# Patient Record
Sex: MALE | Race: WHITE | NOT HISPANIC OR LATINO | Employment: OTHER | URBAN - METROPOLITAN AREA
[De-identification: names, ages, dates, MRNs, and addresses within clinical notes are randomized per-mention and may not be internally consistent; named-entity substitution may affect disease eponyms.]

---

## 2021-03-03 DIAGNOSIS — Z23 NEED FOR VACCINATION: ICD-10-CM

## 2025-02-14 ENCOUNTER — APPOINTMENT (OUTPATIENT)
Dept: RADIOLOGY | Facility: MEDICAL CENTER | Age: 70
End: 2025-02-14
Attending: EMERGENCY MEDICINE
Payer: MEDICARE

## 2025-02-14 ENCOUNTER — HOSPITAL ENCOUNTER (EMERGENCY)
Facility: MEDICAL CENTER | Age: 70
End: 2025-02-14
Attending: EMERGENCY MEDICINE
Payer: MEDICARE

## 2025-02-14 ENCOUNTER — PHARMACY VISIT (OUTPATIENT)
Dept: PHARMACY | Facility: MEDICAL CENTER | Age: 70
End: 2025-02-14
Payer: MEDICARE

## 2025-02-14 VITALS
SYSTOLIC BLOOD PRESSURE: 162 MMHG | OXYGEN SATURATION: 95 % | HEART RATE: 75 BPM | RESPIRATION RATE: 15 BRPM | HEIGHT: 73 IN | DIASTOLIC BLOOD PRESSURE: 95 MMHG | WEIGHT: 222 LBS | TEMPERATURE: 97.4 F | BODY MASS INDEX: 29.42 KG/M2

## 2025-02-14 DIAGNOSIS — H49.01 RIGHT OCULOMOTOR NERVE PALSY: ICD-10-CM

## 2025-02-14 LAB
ANION GAP SERPL CALC-SCNC: 14 MMOL/L (ref 7–16)
APTT PPP: 26.6 SEC (ref 24.7–36)
BASOPHILS # BLD AUTO: 0.5 % (ref 0–1.8)
BASOPHILS # BLD: 0.05 K/UL (ref 0–0.12)
BUN SERPL-MCNC: 18 MG/DL (ref 8–22)
CALCIUM SERPL-MCNC: 9.6 MG/DL (ref 8.5–10.5)
CHLORIDE SERPL-SCNC: 103 MMOL/L (ref 96–112)
CO2 SERPL-SCNC: 21 MMOL/L (ref 20–33)
CREAT SERPL-MCNC: 1.33 MG/DL (ref 0.5–1.4)
CRP SERPL HS-MCNC: <0.3 MG/DL (ref 0–0.75)
EKG IMPRESSION: NORMAL
EOSINOPHIL # BLD AUTO: 0.01 K/UL (ref 0–0.51)
EOSINOPHIL NFR BLD: 0.1 % (ref 0–6.9)
ERYTHROCYTE [DISTWIDTH] IN BLOOD BY AUTOMATED COUNT: 42.1 FL (ref 35.9–50)
ERYTHROCYTE [SEDIMENTATION RATE] IN BLOOD BY WESTERGREN METHOD: 4 MM/HOUR (ref 0–20)
GFR SERPLBLD CREATININE-BSD FMLA CKD-EPI: 57 ML/MIN/1.73 M 2
GLUCOSE SERPL-MCNC: 112 MG/DL (ref 65–99)
HCT VFR BLD AUTO: 48.5 % (ref 42–52)
HGB BLD-MCNC: 16.4 G/DL (ref 14–18)
IMM GRANULOCYTES # BLD AUTO: 0.05 K/UL (ref 0–0.11)
IMM GRANULOCYTES NFR BLD AUTO: 0.5 % (ref 0–0.9)
INR PPP: 1.04 (ref 0.87–1.13)
LYMPHOCYTES # BLD AUTO: 2.41 K/UL (ref 1–4.8)
LYMPHOCYTES NFR BLD: 24.6 % (ref 22–41)
MCH RBC QN AUTO: 28.6 PG (ref 27–33)
MCHC RBC AUTO-ENTMCNC: 33.8 G/DL (ref 32.3–36.5)
MCV RBC AUTO: 84.6 FL (ref 81.4–97.8)
MONOCYTES # BLD AUTO: 0.57 K/UL (ref 0–0.85)
MONOCYTES NFR BLD AUTO: 5.8 % (ref 0–13.4)
NEUTROPHILS # BLD AUTO: 6.72 K/UL (ref 1.82–7.42)
NEUTROPHILS NFR BLD: 68.5 % (ref 44–72)
NRBC # BLD AUTO: 0 K/UL
NRBC BLD-RTO: 0 /100 WBC (ref 0–0.2)
PLATELET # BLD AUTO: 252 K/UL (ref 164–446)
PMV BLD AUTO: 9.7 FL (ref 9–12.9)
POTASSIUM SERPL-SCNC: 3.8 MMOL/L (ref 3.6–5.5)
PROTHROMBIN TIME: 13.6 SEC (ref 12–14.6)
RBC # BLD AUTO: 5.73 M/UL (ref 4.7–6.1)
SODIUM SERPL-SCNC: 138 MMOL/L (ref 135–145)
WBC # BLD AUTO: 9.8 K/UL (ref 4.8–10.8)

## 2025-02-14 PROCEDURE — 86140 C-REACTIVE PROTEIN: CPT

## 2025-02-14 PROCEDURE — RXMED WILLOW AMBULATORY MEDICATION CHARGE: Performed by: EMERGENCY MEDICINE

## 2025-02-14 PROCEDURE — 85730 THROMBOPLASTIN TIME PARTIAL: CPT

## 2025-02-14 PROCEDURE — 70496 CT ANGIOGRAPHY HEAD: CPT

## 2025-02-14 PROCEDURE — 99285 EMERGENCY DEPT VISIT HI MDM: CPT

## 2025-02-14 PROCEDURE — 70551 MRI BRAIN STEM W/O DYE: CPT

## 2025-02-14 PROCEDURE — 700117 HCHG RX CONTRAST REV CODE 255: Performed by: EMERGENCY MEDICINE

## 2025-02-14 PROCEDURE — 85025 COMPLETE CBC W/AUTO DIFF WBC: CPT

## 2025-02-14 PROCEDURE — 36415 COLL VENOUS BLD VENIPUNCTURE: CPT

## 2025-02-14 PROCEDURE — 96374 THER/PROPH/DIAG INJ IV PUSH: CPT

## 2025-02-14 PROCEDURE — 70544 MR ANGIOGRAPHY HEAD W/O DYE: CPT

## 2025-02-14 PROCEDURE — 700111 HCHG RX REV CODE 636 W/ 250 OVERRIDE (IP): Mod: JZ | Performed by: EMERGENCY MEDICINE

## 2025-02-14 PROCEDURE — 80048 BASIC METABOLIC PNL TOTAL CA: CPT

## 2025-02-14 PROCEDURE — 85652 RBC SED RATE AUTOMATED: CPT

## 2025-02-14 PROCEDURE — 85610 PROTHROMBIN TIME: CPT

## 2025-02-14 PROCEDURE — 93005 ELECTROCARDIOGRAM TRACING: CPT | Mod: TC | Performed by: EMERGENCY MEDICINE

## 2025-02-14 PROCEDURE — 70498 CT ANGIOGRAPHY NECK: CPT

## 2025-02-14 RX ORDER — MIDAZOLAM HYDROCHLORIDE 1 MG/ML
2 INJECTION INTRAMUSCULAR; INTRAVENOUS
Status: COMPLETED | OUTPATIENT
Start: 2025-02-14 | End: 2025-02-14

## 2025-02-14 RX ORDER — PREDNISONE 20 MG/1
60 TABLET ORAL ONCE
Status: COMPLETED | OUTPATIENT
Start: 2025-02-14 | End: 2025-02-14

## 2025-02-14 RX ORDER — PREDNISONE 20 MG/1
60 TABLET ORAL DAILY
Qty: 21 TABLET | Refills: 0 | Status: SHIPPED | OUTPATIENT
Start: 2025-02-14 | End: 2025-02-21

## 2025-02-14 RX ADMIN — MIDAZOLAM HYDROCHLORIDE 2 MG: 1 INJECTION, SOLUTION INTRAMUSCULAR; INTRAVENOUS at 18:16

## 2025-02-14 RX ADMIN — PREDNISONE 60 MG: 20 TABLET ORAL at 20:33

## 2025-02-14 RX ADMIN — IOHEXOL 80 ML: 350 INJECTION, SOLUTION INTRAVENOUS at 17:00

## 2025-02-14 NOTE — ED TRIAGE NOTES
Chief Complaint   Patient presents with    Sent by MD     Sent by MD for Isolated 3rd nerve palsy, Requesting MRI and MRA to Rule out aneurism pushing on the nerve      Blurred Vision     Blurred/double vision, starting Wednesday      Pt reports claustrophobia - will need to be medicated for imaging.     Pt ambulatory to triage for above complaint.      Pt is alert/oriented and follows commands. Pt speaking in full sentences and responds appropriately to questions. No acute distress noted in triage and respirations are even and unlabored.     Pt placed in lobby and educated on triage process. Pt encouraged to alert staff for any changes in condition.

## 2025-02-14 NOTE — ED PROVIDER NOTES
ED Provider Note    CHIEF COMPLAINT  Chief Complaint   Patient presents with    Sent by MD     Sent by MD for Isolated 3rd nerve palsy, Requesting MRI and MRA to Rule out aneurism pushing on the nerve      Blurred Vision     Blurred/double vision, starting Wednesday        EXTERNAL RECORDS REVIEWED  Outpatient Notes referred here for cranial nerve III palsy on the right    HPI/ROS  LIMITATION TO HISTORY   Select: : None  OUTSIDE HISTORIAN(S):  ofe Zazueta is a 69 y.o. male who presents to the emergency department chief complaint of double vision.  The patient states that everyone in their family was sick with strep earlier this week and he had been feeling sick and kind about a really bad headache for a few days and on Wednesday noticed some headache and some discount of eye pressure on the right side which could have progressed over the last 48 hours to the point that now he feels dizzy and he has double vision whenever he opens his eyes.  He denies any unilateral weakness numbness or tingling no facial droop no speech difficulty.  He followed up with his ophthalmologist today due to the double vision and there was sent here to be concern for a cranial nerve III palsy.    PAST MEDICAL HISTORY   has a past medical history of Numbness and tingling in right hand and Pain (11-07-14).    SURGICAL HISTORY   has a past surgical history that includes inj,epidural/lumb/sac single (6/6/2014); rotator cuff repair (1970); other (1980'S); cervical disk and fusion anterior (11/21/2014); and carpal tunnel release (11/21/2014).    FAMILY HISTORY  History reviewed. No pertinent family history.    SOCIAL HISTORY  Social History     Tobacco Use    Smoking status: Never    Smokeless tobacco: Never   Vaping Use    Vaping status: Never Used   Substance and Sexual Activity    Alcohol use: No    Drug use: No    Sexual activity: Not on file       CURRENT MEDICATIONS  Home Medications       Reviewed by Erendira Garcia  "GIANNA (Registered Nurse) on 02/14/25 at 1333  Med List Status: Partial     Medication Last Dose Status   hydrocodone-acetaminophen (NORCO) 5-325 MG TABS per tablet  Active   methocarbamol (ROBAXIN-750) 750 MG TABS  Active   senna-docusate (PERICOLACE OR SENOKOT S) 8.6-50 MG TABS  Active                    ALLERGIES  No Known Allergies    PHYSICAL EXAM  VITAL SIGNS: BP (!) 177/91   Pulse 73   Temp 36.2 °C (97.2 °F) (Temporal)   Resp 16   Ht 1.854 m (6' 1\")   Wt 101 kg (222 lb 0.1 oz)   SpO2 96%   BMI 29.29 kg/m²    Pulse OX: Pulse Oxygen level is within normal limits on room air  Constitutional: Alert in no apparent distress.  HENT: Normocephalic, Atraumatic, MMM  Eyes: Pupils equal round reactive to light bilaterally, extraocular muscles intact on the left, right cannot fully adduct little bit limited for looking up and down on medial.  Mild ptosis of the right eyelid conjunctiva normal, non-icteric.   Heart: Regular rate and rhythm, intact distal pulses   Lungs: Symmetrical movement, no resp distress   Abdomen: Non-tender, non-distended, normal bowel sounds  Skin: Warm, Dry, No erythema, No rash.   Neurologic: Alert and oriented, Symmetric smile, eyes shut tight bilaterally, forehead wrinkles bilaterally, sensation intact to light touch bilateral face, tongue midline, head turn and shoulder shrug with full strength. Hearing intact grossly bilaterally. 5/5 strength shoulder, elbow  b/l. 5/5 strength hip, knee, ankle b/l   SILT biceps, forearms, hands, SILT thighs, shins mid foot   NO pronator drift, negative romberg, no aphasia, no dysarthria  Both eyes open sees double        EKG/LABS  Labs Reviewed   BASIC METABOLIC PANEL - Abnormal; Notable for the following components:       Result Value    Glucose 112 (*)     All other components within normal limits   ESTIMATED GFR - Abnormal; Notable for the following components:    GFR (CKD-EPI) 57 (*)     All other components within normal limits   CBC WITH " DIFFERENTIAL   APTT   PROTHROMBIN TIME   CRP QUANTITIVE (NON-CARDIAC)   SED RATE       I have independently interpreted this EKG    RADIOLOGY/PROCEDURES   I have independently interpreted the diagnostic imaging associated with this visit and am waiting the final reading from the radiologist.   My preliminary interpretation is as follows:     CTA head -no evidence of bleed or large aneurysm  CTA neck no dissection  MRI brain without -no large stroke that I can appreciate    Radiologist interpretation:  MR-BRAIN-W/O   Final Result      1.  Partially empty sella.   2.  Otherwise, MRI of the brain without contrast within normal limits.   3.  Additional views of the orbits are unremarkable.      CT-CTA NECK WITH & W/O-POST PROCESSING   Final Result      No cervical arterial occlusion or high-grade focal stenosis.      CT-CTA HEAD WITH & W/O-POST PROCESS   Final Result      1.  No acute intracranial hemorrhage or CT evidence of territorial infarct.   2.  No evidence of intracranial aneurysm.      MR-MRA HEAD-W/O    (Results Pending)       COURSE & MEDICAL DECISION MAKING    ASSESSMENT, COURSE AND PLAN  Care Narrative:     Patient is a 69-year-old male who presents to the emergency department with binocular diplopia patient states that it has been progressing over the last 48 hours ophthalmology and they were concerned for cranial nerve injury.  If it is a cranial nerve III palsy has not complete because he does not have full ptosis and does not have pupil differentiation on that side.  However the rest of his neurologic exam is within normal limits however he cannot abduct and he is seeing double with both eyes open.  MRI of the brain has been ordered as well as a CTA of the head and neck as this is more sensitive specific for aneurysm.  Will evaluate for electrolyte abnormality such as hyperglycemia significant sodium complication as well as arrhythmia.    DISPOSITION AND DISCUSSIONS    4:51 PM  Discussed the patient  CTAs at this time.  He understands feels comfortable waiting for the MRI and is resting comfortably.  Laboratory analysis is unremarkable no significant electrolyte abnormality or hyperglycemia no evidence of elevated ESR CRP that would be concerning for temporal arteritis    8:21 PM  I reassessed patient at the bedside there is no evidence of an acute stroke there is no evidence of aneurysm he has a mildly empty sella on his MRI but otherwise nonspecific laboratory and Alysis is nonspecific his glucose is only 112 there is no other signs of inflammatory reactions.  He will be started on prednisone at home and given the first dose here in the emergency department as well as over the next week for possible peripheral neuropathy like component he will follow-up with ophthalmology as well as primary care provider and return to the emergency department for any new or worsening issues.      I have discussed management of the patient with the following physicians and AMISHA's:  none    Discussion of management with other QHP or appropriate source(s): None     Escalation of care considered, and ultimately not performed:acute inpatient care management, however at this time, the patient is most appropriate for outpatient management    Barriers to care at this time, including but not limited to:  na .     Decision tools and prescription drugs considered including, but not limited to: Medication modification prednisone .    The patient will return for new or worsening symptoms and is stable at the time of discharge.    The patient is referred to a primary physician for blood pressure management, diabetic screening, and for all other preventative health concerns.    DISPOSITION:  Patient will be discharged home in stable condition.    FOLLOW UP:  MIKI Flores  [id number]  [facility] [address]  [phone]    Schedule an appointment as soon as possible for a visit       Shawn Romeo M.D.  [address]  [id number]  [facility] [address]  [phone]    Go to       [facility], Emergency Dept  [address]  [place] [address] [phone]    If symptoms worsen      OUTPATIENT MEDICATIONS:  New Prescriptions    PREDNISONE (DELTASONE) 20 MG TAB    Take 3 Tablets by mouth every day for 7 days.         FINAL DIAGNOSIS  1. Right oculomotor nerve palsy         Electronically signed by: Candi Rose M.D., 2/14/2025 2:32 PM

## 2025-02-14 NOTE — ED NOTES
Pt ambulated to [facility] from [facility] with steady gait.  On monitor, call light in reach. Chart up for ERP.

## 2025-02-14 NOTE — ED NOTES
PIV established, labs drawn and sent  MRI screening form completed, meds ordered for claustrophobia.

## 2025-02-15 NOTE — ED NOTES
Pt discharged to [facility] with steady gait. GCS 15. IV discontinued and gauze placed, pt in possession of belongings. Pt provided discharge education and information pertaining to medications and follow up appointments. Pt received copy of discharge instructions and verbalized understanding.     Vitals:    02/14/25 1920   BP: (!) 162/95   Pulse: 75   Resp: 15   Temp: 36.3 °C (97.4 °F)   SpO2: 95%

## 2025-02-20 ENCOUNTER — OFFICE VISIT (OUTPATIENT)
Dept: OPHTHALMOLOGY | Facility: MEDICAL CENTER | Age: 70
End: 2025-02-20
Payer: MEDICARE

## 2025-02-20 DIAGNOSIS — E78.5 HYPERLIPIDEMIA, UNSPECIFIED HYPERLIPIDEMIA TYPE: ICD-10-CM

## 2025-02-20 DIAGNOSIS — I10 PRIMARY HYPERTENSION: ICD-10-CM

## 2025-02-20 DIAGNOSIS — E11.9 TYPE 2 DIABETES MELLITUS WITHOUT COMPLICATION, WITHOUT LONG-TERM CURRENT USE OF INSULIN (HCC): ICD-10-CM

## 2025-02-20 DIAGNOSIS — H49.01 3RD NERVE PALSY, PARTIAL, RIGHT: ICD-10-CM

## 2025-02-20 PROBLEM — E78.2 MIXED HYPERLIPIDEMIA: Status: ACTIVE | Noted: 2025-02-20

## 2025-02-20 PROCEDURE — 99205 OFFICE O/P NEW HI 60 MIN: CPT | Mod: 25 | Performed by: STUDENT IN AN ORGANIZED HEALTH CARE EDUCATION/TRAINING PROGRAM

## 2025-02-20 PROCEDURE — 92250 FUNDUS PHOTOGRAPHY W/I&R: CPT | Performed by: STUDENT IN AN ORGANIZED HEALTH CARE EDUCATION/TRAINING PROGRAM

## 2025-02-20 ASSESSMENT — CONF VISUAL FIELD
OD_INFERIOR_NASAL_RESTRICTION: 0
OD_SUPERIOR_NASAL_RESTRICTION: 0
OD_NORMAL: 1
OS_SUPERIOR_TEMPORAL_RESTRICTION: 0
OD_INFERIOR_TEMPORAL_RESTRICTION: 0
OS_SUPERIOR_NASAL_RESTRICTION: 0
OS_INFERIOR_TEMPORAL_RESTRICTION: 0
OD_SUPERIOR_TEMPORAL_RESTRICTION: 0
OS_NORMAL: 1
OS_INFERIOR_NASAL_RESTRICTION: 0

## 2025-02-20 ASSESSMENT — REFRACTION_WEARINGRX
OD_SPHERE: +1.25
OD_ADD: +2.00
OS_ADD: +2.00
OS_SPHERE: +1.25
OS_CYLINDER: +0.50
OS_AXIS: 171
SPECS_TYPE: TRIFOCAL
OD_AXIS: 175
OD_CYLINDER: +0.50

## 2025-02-20 ASSESSMENT — TONOMETRY
OD_IOP_MMHG: 14
OS_IOP_MMHG: 16

## 2025-02-20 ASSESSMENT — VISUAL ACUITY
CORRECTION_TYPE: GLASSES
OD_CC: J1+
METHOD: SNELLEN - LINEAR
OS_CC: 20/20
OD_CC: 20/20
OS_CC: J1+

## 2025-02-20 ASSESSMENT — REFRACTION_MANIFEST
OD_SPHERE: +1.50
OD_AXIS: 144
OS_CYLINDER: +0.50
OD_CYLINDER: +0.75
OS_AXIS: 174
OS_SPHERE: +1.50

## 2025-02-20 ASSESSMENT — ENCOUNTER SYMPTOMS: DOUBLE VISION: 1

## 2025-02-20 ASSESSMENT — EXTERNAL EXAM - RIGHT EYE: OD_EXAM: NORMAL

## 2025-02-20 ASSESSMENT — SLIT LAMP EXAM - LIDS
COMMENTS: NORMAL
COMMENTS: NORMAL

## 2025-02-20 ASSESSMENT — EXTERNAL EXAM - LEFT EYE: OS_EXAM: NORMAL

## 2025-02-20 NOTE — PROGRESS NOTES
Peds/Neuro Ophthalmology:   Nu Tidwell M.D.    Date & Time note created:    2/20/2025   4:16 PM     Referring MD / APRN:  MIKI Flores, No att. providers found    Patient ID:  Name:             Lee Zazueta   YOB: 1955  Age:                 69 y.o.  male   MRN:               [id number]    Chief Complaint/Reason for Visit:     Other (Double vision)      History of Present Illness:      Lee Zazueta is a 70 yo man presenting for evaluation of ptosis OD and double vision     He is referred by Dr Muñoz at [facility]. Exam 2/14/25 demonstrated VA 20/30 OD 20/25 OS, 1+ NS OU, normal optic nerves. Ptosis OD.     On 2/12 Lee developed R retro orbital throbbing pain. The next day he started to notice subtle changes in his vision which further developed into cody double vision and R ptosis within the next few days. He was able to see Dr Agudelo 2/14/25 who reecommended stat evaluation in the ER. Pt presented to the [facility] ER where work up included negative ESR and CRP. CTA head and neck were negative. MRI brain wo was negative for acute abnormality. He was discharged on prednisone 20mg daily    Lee continues to have double vision and ptosis OD. He describes the double vision as oblique, binocular. He denies jaw claudication, night sweats, scalp tenderness, unintentional weight loss.    Vascular risk factors: diabetic (A1c 6.5),  systolic in ED, no FREDO, no smoking history, elevated LDL         Review of Systems:  ROS    Past Medical History:   Past Medical History:   Diagnosis Date    Numbness and tingling in right hand     Pain 11-07-14    neck, right shoulder, 1/10       Past Surgical History:  Past Surgical History:   Procedure Laterality Date    CERVICAL DISK AND FUSION ANTERIOR  11/21/2014    Performed by Chad Mcguire M.D. at SURGERY [facility] ORS    CARPAL TUNNEL RELEASE  11/21/2014    Performed by Chad Mcguire M.D. at SURGERY [facility] ORS    INJ,EPIDURAL/LUMB/SAC  SINGLE  6/6/2014    Performed by Lukas Talavera M.D. at SURGERY [facility]    OTHER  1980'S    DEVIATED SEPTUM    ROTATOR CUFF REPAIR  1970    LEFT X 2, RIGHT X 1       Current Outpatient Medications:  Current Outpatient Medications   Medication Sig Dispense Refill    predniSONE (DELTASONE) 20 MG Tab Take 3 Tablets by mouth every day for 7 days. 21 Tablet 0    hydrocodone-acetaminophen (NORCO) 5-325 MG TABS per tablet Take 1-2 Tabs by mouth every four hours as needed ((Pain scale 1 - 3)). (Patient not taking: Reported on 2/20/2025) 60 Tab 0    senna-docusate (PERICOLACE OR SENOKOT S) 8.6-50 MG TABS Take 2 Tabs by mouth every day. (Patient not taking: Reported on 2/20/2025) 30 Tab 3    methocarbamol (ROBAXIN-750) 750 MG TABS Take 1 Tab by mouth 3 times a day. (Patient not taking: Reported on 2/20/2025) 40 Tab 3     No current facility-administered medications for this visit.       Allergies:  No Known Allergies    Family History:  Family History   Problem Relation Age of Onset    Glasses Mother     Glasses Father        Social History:  Social History     Socioeconomic History    Marital status:      Spouse name: Not on file    Number of children: Not on file    Years of education: Not on file    Highest education level: Not on file   Occupational History    Not on file   Tobacco Use    Smoking status: Never    Smokeless tobacco: Never   Vaping Use    Vaping status: Never Used   Substance and Sexual Activity    Alcohol use: No    Drug use: No    Sexual activity: Not on file   Other Topics Concern    Not on file   Social History Narrative    Retired     Social Drivers of Health     Financial Resource Strain: Not on file   Food Insecurity: Not on file   Transportation Needs: Not on file   Physical Activity: Not on file   Stress: Not on file   Social Connections: Not on file   Intimate Partner Violence: Not on file   Housing Stability: Not on file          Physical Exam:  Physical Exam    Oriented x  3  Weight/BMI: There is no height or weight on file to calculate BMI.  There were no vitals taken for this visit.    Base Eye Exam       Visual Acuity (Snellen - Linear)         Right Left    Dist cc 20/20 20/20    Near cc J1+ J1+      Correction: Glasses              Tonometry (i care, 1:26 PM)         Right Left    Pressure 14 16              Pupils         Pupils Dark Light Shape React APD    Right PERRL 5 4 Round Brisk None    Left PERRL 5 4 Round Brisk None              Visual Fields         Right Left     Full Full              Extraocular Movement         Right Left      Full     -- 70 --   100  70   -- 100 --    -- -- --   --  --   -- -- --                     Additional Tests       Color         Right Left    Ishihara 9/9 9/9              Stereo       Fly: -    Animals: 0/3    Circles: 2/9                  Strabismus Exam       Reading #1   (Edited by: Nu Tidwell M.D.)      Method: Alternate cover    Correction: cc      Distance Near Near +3DS N Bifocals                      - - 70 - -   - - - - - -                      XT 30 100  70  XT 35 - -  - -  XT > 50      flick RHT      flick RHT      flick RHT       - - 100 - -   - - - - - -                           Reading #2   (Edited by: Nu Tidwell M.D.)      Method: Duarte    Correction: cc      Distance Near Near +3DS N Bifocals                      - - - - - -   - - - - - -                      RHT 10 - -  - -  RHT 8 - -  - -  RHT 6     XT 14     XT 20     XT >40      - - - - - -   - - - - - -                   R Tilt L Tilt   LHT 4 RHT 18                              Comments      Subjectively prefers 30 base in and 5 RHT OD                 Slit Lamp and Fundus Exam       External Exam         Right Left    External Normal Normal              Slit Lamp Exam         Right Left    Lids/Lashes Normal Normal    Conjunctiva/Sclera White and quiet White and quiet    Cornea Clear Clear    Anterior Chamber Deep and quiet Deep and quiet    Iris Round and  reactive Round and reactive    Lens Clear Clear              Fundus Exam         Right Left    Disc Normal Normal    Macula Normal Normal                  Refraction       Wearing Rx         Sphere Cylinder Axis Add    Right +1.25 +0.50 175 +2.00    Left +1.25 +0.50 171 +2.00      Type: Trifocal              Manifest Refraction         Sphere Cylinder Axis    Right +1.50 +0.75 144    Left +1.50 +0.50 174                    Pertinent Lab/Test/Imaging Review:  MRA head wo 2/14/25:   My read: no Pcomm aneurysm, no flow limiting stenosis    MRI brain wo 2/14/25  1.  Partially empty sella.  2.  Otherwise, MRI of the brain without contrast within normal limits.  3.  Additional views of the orbits are unremarkable.  My read: no optic nerve abnormality. No acute midbrain stroke. Posterior fossa subarachnoid cyst    CTA head/neck 2/14/25  1.  No acute intracranial hemorrhage or CT evidence of territorial infarct.  2.  No evidence of intracranial aneurysm.    Labs 2/14/25  ESR 4  CRP <0.30  Plts 252    Assessment and Plan:     3rd nerve palsy, partial, right  70 yo man presenting with acute double vision and ptosis OD    Onset 2/12/25. Evaluated in ED 2/14 where vessel imaging and MRI brain wo negative. No GCA symptoms. ESR and CRP wnl    Exam 2/20/25: no APD, pupils equal and briskly reactive. VA 20/20 OD 20/20 OS, 9/9 color plates OU. Full CF. Mild supraduction and adduction deficit OD. Partial ptosis OD. R eye down and out. Steele demonstrates a large incomitant RHT and XT. Optic nerves pink with sharp disc margins. RNFL 87 OD 96 OS    Plan:   Exam consistent with a partial R 3rd nerve palsy. Work up in ER included negative vessel imaging, negative MRI brain and normal inflammatory markers. Pt additionally denies any GCA symptoms. 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 Vascular risk factors include DM2, HTN and hyperlipidemia (all untreated) as well as age. Pt to follow up with  PCP for vascular risk factor optimization. Will repeat testing in 2 months. If alignment fails to improve, will consider MRI IAC wwo contrast and MG panel    -RTC in 2 months  -Patching for symptomatic relief  -Follow up with PCP for vascular risk factor optimization     Primary hypertension  BP on ED presentation was 170s systolic  No antihypertensives    Plan:  Recommended home bp monitoring  FU with PCP     Type 2 diabetes mellitus without complication, without long-term current use of insulin (HCC)  Recent A1c 6.5  Recommended lifestyle modifications to reduce A1c. Pt to follow up with PCP for further management  Discussed risk factor for microvascular cranial neuropathies    Mixed hyperlipidemia  LDL elevated 2x above limit normal  Discussed dietary management and follow up with PCP         Nu Tidwell M.D.    72 total minutes were spent reviewing imaging, records, examining the patient and documenting.

## 2025-02-20 NOTE — PROGRESS NOTES
Peds/Neuro Ophthalmology:   Andrzej Rosenberg    Date & Time note created:    2/20/2025   1:29 PM     Referring MD / APRN:  MIKI Flores, No att. providers found    Patient ID:  Name:             Lee Zazueta   YOB: 1955  Age:                 69 y.o.  male   MRN:               [id number]    Chief Complaint/Reason for Visit:     Other (Double vision)      History of Present Illness:    Lee Zazueta is a 69 y.o. male   Other        Review of Systems:  Review of Systems   Eyes:  Positive for double vision.   All other systems reviewed and are negative.      Past Medical History:   Past Medical History:   Diagnosis Date    Numbness and tingling in right hand     Pain 11-07-14    neck, right shoulder, 1/10       Past Surgical History:  Past Surgical History:   Procedure Laterality Date    CERVICAL DISK AND FUSION ANTERIOR  11/21/2014    Performed by Chad Mcguire M.D. at SURGERY [facility] ORS    CARPAL TUNNEL RELEASE  11/21/2014    Performed by Chad Mcguire M.D. at SURGERY [facility] ORS    INJ,EPIDURAL/LUMB/SAC SINGLE  6/6/2014    Performed by Lukas Talavera M.D. at [facility]    OTHER  1980'S    DEVIATED SEPTUM    ROTATOR CUFF REPAIR  1970    LEFT X 2, RIGHT X 1       Current Outpatient Medications:  Current Outpatient Medications   Medication Sig Dispense Refill    predniSONE (DELTASONE) 20 MG Tab Take 3 Tablets by mouth every day for 7 days. 21 Tablet 0    hydrocodone-acetaminophen (NORCO) 5-325 MG TABS per tablet Take 1-2 Tabs by mouth every four hours as needed ((Pain scale 1 - 3)). (Patient not taking: Reported on 2/20/2025) 60 Tab 0    senna-docusate (PERICOLACE OR SENOKOT S) 8.6-50 MG TABS Take 2 Tabs by mouth every day. (Patient not taking: Reported on 2/20/2025) 30 Tab 3    methocarbamol (ROBAXIN-750) 750 MG TABS Take 1 Tab by mouth 3 times a day. (Patient not taking: Reported on 2/20/2025) 40 Tab 3     No current facility-administered medications  for this visit.       Allergies:  No Known Allergies    Family History:  Family History   Problem Relation Age of Onset    Glasses Mother     Glasses Father        Social History:  Social History     Socioeconomic History    Marital status:      Spouse name: Not on file    Number of children: Not on file    Years of education: Not on file    Highest education level: Not on file   Occupational History    Not on file   Tobacco Use    Smoking status: Never    Smokeless tobacco: Never   Vaping Use    Vaping status: Never Used   Substance and Sexual Activity    Alcohol use: No    Drug use: No    Sexual activity: Not on file   Other Topics Concern    Not on file   Social History Narrative    Retired     Social Drivers of Health     Financial Resource Strain: Not on file   Food Insecurity: Not on file   Transportation Needs: Not on file   Physical Activity: Not on file   Stress: Not on file   Social Connections: Not on file   Intimate Partner Violence: Not on file   Housing Stability: Not on file          Physical Exam:  Physical Exam    Oriented x 3  Weight/BMI: There is no height or weight on file to calculate BMI.  There were no vitals taken for this visit.    Base Eye Exam       Visual Acuity (Snellen - Linear)         Right Left    Dist cc 20/20 20/20    Near cc J1+ J1+      Correction: Glasses              Tonometry (i care, 1:26 PM)         Right Left    Pressure 14 16              Pupils         Pupils React    Right PERRL Slow    Left PERRL Slow                  Additional Tests       Color         Right Left    Ishihara 9/9 9/9              Stereo       Fly: -    Animals: 0/3    Circles: 2/9                  Refraction       Wearing Rx         Sphere Cylinder Axis Add    Right +1.25 +0.50 175 +2.00    Left +1.25 +0.50 171 +2.00      Type: Trifocal              Manifest Refraction         Sphere Cylinder Axis    Right +1.50 +0.75 144    Left +1.50 +0.50 174                    Pertinent Lab/Test/Imaging  Review:      Assessment and Plan:     No problem-specific Assessment & Plan notes found for this encounter.        Andrzej Rosenberg

## 2025-02-21 NOTE — ASSESSMENT & PLAN NOTE
BP on ED presentation was 170s systolic  No antihypertensives    Plan:  Recommended home bp monitoring  FU with PCP

## 2025-02-21 NOTE — ASSESSMENT & PLAN NOTE
Recent A1c 6.5  Recommended lifestyle modifications to reduce A1c. Pt to follow up with PCP for further management  Discussed risk factor for microvascular cranial neuropathies

## 2025-02-21 NOTE — ASSESSMENT & PLAN NOTE
70 yo man presenting with acute double vision and ptosis OD    Onset 2/12/25. Evaluated in ED 2/14 where vessel imaging and MRI brain wo negative. No GCA symptoms. ESR and CRP wnl    Exam 2/20/25: no APD, pupils equal and briskly reactive. VA 20/20 OD 20/20 OS, 9/9 color plates OU. Full CF. Mild supraduction and adduction deficit OD. Partial ptosis OD. R eye down and out. Steele demonstrates a large incomitant RHT and XT. Optic nerves pink with sharp disc margins. RNFL 87 OD 96 OS    Plan:   Exam consistent with a partial R 3rd nerve palsy. Work up in ER included negative vessel imaging, negative MRI brain and normal inflammatory markers. Pt additionally denies any GCA symptoms. 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 Vascular risk factors include DM2, HTN and hyperlipidemia (all untreated) as well as age. Pt to follow up with PCP for vascular risk factor optimization. Will repeat testing in 2 months. If alignment fails to improve, will consider MRI IAC wwo contrast and MG panel    -RTC in 2 months  -Patching for symptomatic relief  -Follow up with PCP for vascular risk factor optimization

## 2025-04-14 NOTE — PROGRESS NOTES
Peds/Neuro Ophthalmology:   Nu Tidwell M.D.    Date & Time note created:    4/15/2025   8:53 AM     Referring MD / APRN:  MIKI Flores, No att. providers found    Patient ID:  Name:             Lee Zazueta   YOB: 1955  Age:                 69 y.o.  male   MRN:               [id number]    Chief Complaint/Reason for Visit:     Other (2 month f.v. for 3rd never palsy, partial right )      History of Present Illness:      Lee Zazueta is a 68 yo man presenting for follow up evaluation of a R 3rd nerve palsy. He was last seen 2/20/25    Gene noticed the double vision completely resolved 2 weeks ago. He will occasionally notice that his eyes take a moment to adjust when going from near to distance, but denies any diplopia on central gaze. He denies any headaches, scalp tenderness, jaw claudication. He is continuing to work on healthy lifestyle choices, and reports home Bps are normal. He did recently hit his R eye on a hard surface when helping his son move.     As a recap,  He is referred by Dr Muñoz at [facility]. Exam 2/14/25 demonstrated VA 20/30 OD 20/25 OS, 1+ NS OU, normal optic nerves. Ptosis OD.     On 2/12 Lee developed R retro orbital throbbing pain. The next day he started to notice subtle changes in his vision which further developed into cody double vision and R ptosis within the next few days. He was able to see Dr Agudelo 2/14/25 who reecommended stat evaluation in the ER. Pt presented to the [facility] ER where work up included negative ESR and CRP. CTA head and neck were negative. MRI brain wo was negative for acute abnormality. He was discharged on prednisone 20mg daily, He denied jaw claudication, night sweats, scalp tenderness, unintentional weight loss.    Initial exam demonstrated a partial R 3rd nerve palsy. Work up in ER included negative vessel imaging, negative MRI brain and normal inflammatory markers. Pt additionally denies any GCA symptoms. 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 Vascular risk factors include DM2, HTN and hyperlipidemia (all untreated) as well as age. Pt to follow up with PCP for vascular risk factor optimization.  If alignment fails to improve, will consider MRI IAC wwo contrast and MG panel    Vascular risk factors: diabetic (A1c 6.5),  systolic in ED, no FREDO, no smoking history, elevated LDL         Review of Systems:  ROS    Past Medical History:   Past Medical History:   Diagnosis Date    Numbness and tingling in right hand     Pain 11-07-14    neck, right shoulder, 1/10       Past Surgical History:  Past Surgical History:   Procedure Laterality Date    CERVICAL DISK AND FUSION ANTERIOR  11/21/2014    Performed by Chad Mcguire M.D. at SUR[facility]ERY [facility]    CARPAL TUNNEL RELEASE  11/21/2014    Performed by Chad Mcguire M.D. at SUR[facility]ERY [facility]    INJ,EPIDURAL/LUMB/SAC SINGLE  6/6/2014    Performed by Lukas Talavera M.D. at [facility]    OTHER  1980'S    DEVIATED SEPTUM    ROTATOR CUFF REPAIR  1970    LEFT X 2, RIGHT X 1       Current Outpatient Medications:  Current Outpatient Medications   Medication Sig Dispense Refill    hydrocodone-acetaminophen (NORCO) 5-325 MG TABS per tablet Take 1-2 Tabs by mouth every four hours as needed ((Pain scale 1 - 3)). (Patient not taking: Reported on 2/20/2025) 60 Tab 0    senna-docusate (PERICOLACE OR SENOKOT S) 8.6-50 MG TABS Take 2 Tabs by mouth every day. (Patient not taking: Reported on 2/20/2025) 30 Tab 3    methocarbamol (ROBAXIN-750) 750 MG TABS Take 1 Tab by mouth 3 times a day. (Patient not taking: Reported on 2/20/2025) 40 Tab 3     No current facility-administered medications for this visit.       Allergies:  No Known Allergies    Family History:  Family History   Problem Relation Age of Onset    Glasses Mother     Glasses Father        Social History:  Social History     Socioeconomic History    Marital status:       Spouse name: Not on file    Number of children: Not on file    Years of education: Not on file    Highest education level: Not on file   Occupational History    Not on file   Tobacco Use    Smoking status: Never    Smokeless tobacco: Never   Vaping Use    Vaping status: Never Used   Substance and Sexual Activity    Alcohol use: No    Drug use: No    Sexual activity: Not on file   Other Topics Concern    Not on file   Social History Narrative    Retired     Social Drivers of Health     Financial Resource Strain: Not on file   Food Insecurity: Not on file   Transportation Needs: Not on file   Physical Activity: Not on file   Stress: Not on file   Social Connections: Not on file   Intimate Partner Violence: Not on file   Housing Stability: Not on file          Physical Exam:  Physical Exam    Oriented x 3  Weight/BMI: There is no height or weight on file to calculate BMI.  There were no vitals taken for this visit.    Base Eye Exam       Visual Acuity (Snellen - Linear)         Right Left    Dist cc 20/20 -1 20/25    Dist ph cc  20/20 -2      Correction: Glasses              Tonometry (i-care, 7:47 AM)         Right Left    Pressure 16 17              Pupils         Dark Light Shape React APD    Right 5 4 Round Brisk None    Left 5 4 Round Brisk None              Visual Fields         Right Left     Full Full              Extraocular Movement         Right Left     Full Full              Neuro/Psych       Oriented x3: Yes    Mood/Affect: Normal                  Additional Tests       Color         Right Left    Ishihara 8/9 9/9              Stereo       Fly: +    Animals: 3/3    Circles: 3/9                  Strabismus Exam       Method: Alternate cover    Correction: cc      Distance Near Near +3.00DS Near Bifocals     X' 10                 - - - - - -  X 4 - - - - - -                      X 2 - -  - -  X 4 - -  - -  X 2                     - - - - - -  X 2 - - - - - -                       Slit Lamp and  Fundus Exam       External Exam         Right Left    External small ecchymosis in inferior orbit Normal              Slit Lamp Exam         Right Left    Lids/Lashes Normal Normal    Conjunctiva/Sclera White and quiet White and quiet    Cornea Clear Clear    Anterior Chamber Deep and quiet Deep and quiet    Iris Round and reactive Round and reactive    Lens Nuclear sclerosis Nuclear sclerosis              Fundus Exam         Right Left    Disc pink, sharp disc margins, flat pink, sharp disc margins, flat    C/D Ratio 0.5 0.5    Macula Normal Normal                  Refraction       Wearing Rx         Sphere Cylinder Axis Add    Right +1.25 +0.50 009 +2.25    Left +1.25 +0.50 169 +2.25      Age: 2yrs    Type: Bifocal              Manifest Refraction (Auto)         Sphere Cylinder Axis    Right +1.50 +1.25 171    Left +1.75 +0.50 177                    Pertinent Lab/Test/Imaging Review:  MRA head wo 2/14/25:   My read: no Pcomm aneurysm, no flow limiting stenosis    MRI brain wo 2/14/25  1.  Partially empty sella.  2.  Otherwise, MRI of the brain without contrast within normal limits.  3.  Additional views of the orbits are unremarkable.  My read: no optic nerve abnormality. No acute midbrain stroke. Posterior fossa subarachnoid cyst    CTA head/neck 2/14/25  1.  No acute intracranial hemorrhage or CT evidence of territorial infarct.  2.  No evidence of intracranial aneurysm.    Labs 2/14/25  ESR 4  CRP <0.30  Plts 252    Assessment and Plan:     3rd nerve palsy, partial, right  70 yo man presenting with acute double vision and ptosis OD    Onset 2/12/25. Evaluated in ED 2/14 where vessel imaging and MRI brain wo negative. No GCA symptoms. ESR and CRP wnl    Exam 2/20/25: no APD, pupils equal and briskly reactive. VA 20/20 OD 20/20 OS, 9/9 color plates OU. Full CF. Mild supraduction and adduction deficit OD. Partial ptosis OD. R eye down and out. Steele demonstrates a large incomitant RHT and XT. Optic nerves pink  with sharp disc margins. RNFL 87 OD 96 OS    Exam 4/15/25: normal afferent visual exam. Small comitant exo on alt cover. Resolved ptosis. Optic nerves pink with sharp disc margins. RNFL 95 OD 96 OS    Plan:   Exam demonstrates complete resolution of prior partial R 3rd nerve palsy. Work up in ER included negative vessel imaging, negative MRI brain and normal inflammatory markers. Pt continues to deny any GCA symptoms. 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 Vascular risk factors include DM2, HTN and hyperlipidemia (all untreated) as well as age. Pt to follow up with PCP for vascular risk factor optimization.    -RTC prn  -Follow up with PCP for vascular risk factor optimization     Type 2 diabetes mellitus without complication, without long-term current use of insulin (HCC)  Recent A1c 6.5  Recommended lifestyle modifications to reduce A1c. Pt to follow up with PCP for further management  Discussed risk factor for microvascular cranial neuropathies    Primary hypertension  BP on ED presentation was 170s systolic  No antihypertensives  Reports home BP's well controlled    Plan:  FU with PCP     Mixed hyperlipidemia  LDL elevated 141  Goal LDL <70  Discussed dietary management and follow up with PCP , may need initiation of statin          Nu Tidwell M.D.  26 total minutes were spent reviewing imaging, records, examining the patient and documenting.

## 2025-04-15 ENCOUNTER — OFFICE VISIT (OUTPATIENT)
Dept: OPHTHALMOLOGY | Facility: MEDICAL CENTER | Age: 70
End: 2025-04-15
Payer: MEDICARE

## 2025-04-15 DIAGNOSIS — E78.2 MIXED HYPERLIPIDEMIA: ICD-10-CM

## 2025-04-15 DIAGNOSIS — H49.01 3RD NERVE PALSY, PARTIAL, RIGHT: ICD-10-CM

## 2025-04-15 DIAGNOSIS — E11.9 TYPE 2 DIABETES MELLITUS WITHOUT COMPLICATION, WITHOUT LONG-TERM CURRENT USE OF INSULIN (HCC): ICD-10-CM

## 2025-04-15 DIAGNOSIS — I10 PRIMARY HYPERTENSION: ICD-10-CM

## 2025-04-15 PROCEDURE — 99213 OFFICE O/P EST LOW 20 MIN: CPT | Mod: 25 | Performed by: STUDENT IN AN ORGANIZED HEALTH CARE EDUCATION/TRAINING PROGRAM

## 2025-04-15 PROCEDURE — 92133 CPTRZD OPH DX IMG PST SGM ON: CPT | Performed by: STUDENT IN AN ORGANIZED HEALTH CARE EDUCATION/TRAINING PROGRAM

## 2025-04-15 PROCEDURE — 92060 SENSORIMOTOR EXAMINATION: CPT | Performed by: STUDENT IN AN ORGANIZED HEALTH CARE EDUCATION/TRAINING PROGRAM

## 2025-04-15 ASSESSMENT — VISUAL ACUITY
OS_PH_CC+: -2
CORRECTION_TYPE: GLASSES
OD_CC: 20/20
METHOD: SNELLEN - LINEAR
OD_CC+: -1
OS_PH_CC: 20/20
OS_CC: 20/25

## 2025-04-15 ASSESSMENT — SLIT LAMP EXAM - LIDS
COMMENTS: NORMAL
COMMENTS: NORMAL

## 2025-04-15 ASSESSMENT — REFRACTION_MANIFEST
METHOD_AUTOREFRACTION: 1
OS_AXIS: 177
OD_CYLINDER: +1.25
OS_SPHERE: +1.75
OS_CYLINDER: +0.50
OD_AXIS: 171
OD_SPHERE: +1.50

## 2025-04-15 ASSESSMENT — CUP TO DISC RATIO
OD_RATIO: 0.5
OS_RATIO: 0.5

## 2025-04-15 ASSESSMENT — CONF VISUAL FIELD
OD_NORMAL: 1
OD_INFERIOR_TEMPORAL_RESTRICTION: 0
OS_SUPERIOR_NASAL_RESTRICTION: 0
OS_SUPERIOR_TEMPORAL_RESTRICTION: 0
OS_NORMAL: 1
OD_INFERIOR_NASAL_RESTRICTION: 0
OD_SUPERIOR_TEMPORAL_RESTRICTION: 0
OS_INFERIOR_TEMPORAL_RESTRICTION: 0
OD_SUPERIOR_NASAL_RESTRICTION: 0
OS_INFERIOR_NASAL_RESTRICTION: 0

## 2025-04-15 ASSESSMENT — REFRACTION_WEARINGRX
OD_ADD: +2.25
OS_AXIS: 169
OS_ADD: +2.25
OD_CYLINDER: +0.50
OD_AXIS: 009
OS_SPHERE: +1.25
OS_CYLINDER: +0.50
OD_SPHERE: +1.25
SPECS_TYPE: BIFOCAL

## 2025-04-15 ASSESSMENT — EXTERNAL EXAM - LEFT EYE: OS_EXAM: NORMAL

## 2025-04-15 ASSESSMENT — TONOMETRY
OD_IOP_MMHG: 16
OS_IOP_MMHG: 17
IOP_METHOD: I-CARE

## 2025-04-15 NOTE — ASSESSMENT & PLAN NOTE
LDL elevated 141  Goal LDL <70  Discussed dietary management and follow up with PCP , may need initiation of statin

## 2025-04-15 NOTE — ASSESSMENT & PLAN NOTE
BP on ED presentation was 170s systolic  No antihypertensives  Reports home BP's well controlled    Plan:  FU with PCP

## 2025-04-15 NOTE — ASSESSMENT & PLAN NOTE
68 yo man presenting with acute double vision and ptosis OD    Onset 2/12/25. Evaluated in ED 2/14 where vessel imaging and MRI brain wo negative. No GCA symptoms. ESR and CRP wnl    Exam 2/20/25: no APD, pupils equal and briskly reactive. VA 20/20 OD 20/20 OS, 9/9 color plates OU. Full CF. Mild supraduction and adduction deficit OD. Partial ptosis OD. R eye down and out. Steele demonstrates a large incomitant RHT and XT. Optic nerves pink with sharp disc margins. RNFL 87 OD 96 OS    Exam 4/15/25: normal afferent visual exam. Small comitant exo on alt cover. Resolved ptosis. Optic nerves pink with sharp disc margins. RNFL 95 OD 96 OS    Plan:   Exam demonstrates complete resolution of prior partial R 3rd nerve palsy. Work up in ER included negative vessel imaging, negative MRI brain and normal inflammatory markers. Pt continues to deny any GCA symptoms. 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 Vascular risk factors include DM2, HTN and hyperlipidemia (all untreated) as well as age. Pt to follow up with PCP for vascular risk factor optimization.    -RTC prn  -Follow up with PCP for vascular risk factor optimization

## 2025-05-29 ENCOUNTER — RESEARCH ENCOUNTER (OUTPATIENT)
Dept: RESEARCH | Facility: MEDICAL CENTER | Age: 70
End: 2025-05-29

## 2025-05-29 ENCOUNTER — HOSPITAL ENCOUNTER (OUTPATIENT)
Facility: MEDICAL CENTER | Age: 70
End: 2025-05-29
Attending: INTERNAL MEDICINE
Payer: MEDICARE

## 2025-05-29 VITALS
SYSTOLIC BLOOD PRESSURE: 124 MMHG | RESPIRATION RATE: 16 BRPM | HEART RATE: 60 BPM | HEIGHT: 73 IN | BODY MASS INDEX: 18.69 KG/M2 | WEIGHT: 141 LBS | OXYGEN SATURATION: 95 % | DIASTOLIC BLOOD PRESSURE: 64 MMHG

## 2025-05-29 DIAGNOSIS — R00.2 PALPITATIONS: ICD-10-CM

## 2025-05-29 DIAGNOSIS — E78.2 MIXED HYPERLIPIDEMIA: Primary | ICD-10-CM

## 2025-05-29 PROCEDURE — 99215 OFFICE O/P EST HI 40 MIN: CPT | Performed by: INTERNAL MEDICINE

## 2025-05-29 NOTE — PATIENT INSTRUCTIONS
A - Antiplatelet - Clopidogrel (PLAVIX) reduces your risk of cardiac event by 27% compared to Aspirin 81 mg daily (HOST EXAM study 2021), prasrugrel (EFFIENT), ticagrelor (BRILINTA)) may be used for the first year.  Aspirin 81 mg daily is associated with a 20% less use of heart event and is used the first year after a cardiac event, stent or CABG.  B - Blood Pressure Control - reduces your risk or heart attack and stroke, the goal is <130/80.  C - Cholesterol Management - statins dramatically reduce your risk; for those that are intolerant to statins, there are alternatives.  D - Diet - MEDITERRANEAN DIET or Cardiac rehab diets, Cardiosmart.org.  E - Exercise - at least 2.5 hours of moderate exercise weekly  (typical brisk walking or similar activity).  F - Fats - VASCEPA, or EPA Fish oil (if Vascepa too expensive) for elevated triglycerides (REDUCE IT trial showed reduction from 22% 5 year MACE to 17%).  G - Good Vibes - meditation, exercise, yoga, Pilates, mindfulness, Lai-Chi, stress reduction.  H - Heart Failure - betablockers, sucubatril (ENTRESTO) 16% less risk of dying over 3 years, spironolactone, empagliflozin (JARDIANCE) 17% less risk of dying over 2 years, CRT +/- ICD.  I - Inflammation - Colchicine in the LoDoCo2 study in 2020 reduced the risk of heart attack by 30% in 2.5 year follow up.  R - Rehab - Cardiac Rehab reduces risk of dying by 13-24% and need to go to the hospital by 30% within the first year. Compared to regular Cardiac Rehab, Intensive Cardiac Rehab ([place] [facility]) was shown to reduce the risk of major events 17% to 11% and hospitalization for CHF from 8% to 2%. (Nutrients 5053Bur09(11:8645)  S - Smoking - never smoke, if you do smoke ask for help to quit for good. Patients who quit smoking after heart attack have 36% less likely risk of dying.  Resources are 1-800-QUIT-NOW 3sun in addition to Chantix, bupropion (Zyban) or nicotine replacement  T - Type II Diabetes  - pills empagliflozin (JARDIANCE) 38% less risk of dying over 4 years, and/or weekly injections: tirzepatide (Mounjaro), semaglutide (Ozempic), liraglutide (Victoza), dulaglutide (Trulicity) ~26% less risk of MACE in 2 years.  V - Vaccines - Annual flu shot and COVID vaccine reduces the risk of serious cardiovascular complications from these deadly infections.  W - Weight - maintain a healthy weight. Semaglutide (WEGOVY) weekly injection was shown to reduce weight by 10% and heart events by 20% for patients with CAD and BMI > 27 in the SELECT trial (6.5% vs 8% in 48 month follow up [facility] 12/2023).      Work on at least 2.5 - 5 hours a week of moderate exercise    Please look into the following diets and incorporate them into your diet  LOW SALT DIET   KEEP YOUR SODIUM EQUAL TO CALORIES AND NO MORE THAN DOUBLE THE CALORIES FOR A LOW SALT DIET    Cardiosmart.org - great resource for American College of Cardiology on heart disease prevention and treatment    FOR TREATMENT OR PREVENTION OF CORONARY ARTERY DISEASE  These three programs are approved by Medicare/Insurers for those with heart disease  Pratik - [facility]  Dr. Romero's Program for Reversing Heart Disease - Lloyd Maldonado's Cardiologist vegetarian-based  [facility] - [place]-based mind-body Program    Mediterranean Diet has been shown to be a hearty healthy diet.    This is a commonly referenced Program  Dr Tijerina - [place] over [place] (book and documentary) - vegetarian-based    FOR TREATMENT OF BLOOD PRESSURE  DASH DIET - American Heart Association for treatment of HYPERTENSION    FOR TREATMENT OF BAD CHOLESTEROL/FATS  REDUCE PROCESSED SUGAR AS MUCH AS POSSIBLE  INCREASE WHOLE GRAINS/VEGETABLES  INCREASE FIBER    Lowering total cholesterol and LDL (bad) cholesterol:  - Eat leaner cuts of meat, or eliminate altogether if possible red meat, and frequently substitute fish or chicken.  - Limit saturated  fat to no more than 7-10% of total calories no more than 10 g per day is recommended. Some sources of saturated fat include butter, animal fats, hydrogenated vegetable fats and oils, many desserts, whole milk dairy products.  - Replaced saturated fats with polyunsaturated fats and monounsaturated fats. Foods high in monounsaturated fat include nuts, canola oil, avocados, and olives.  - Limit trans fat (processed foods) and replaced with fresh fruits and vegetables  - Recommend nonfat dairy products  - Increase substantially the amount of soluble fiber intake (legumes such as beans, fruit, whole grains).  - Consider nutritional supplements: plant sterile spreads such as Benecol, fish oil,  flaxseed oil, omega-3 acids EPA capsules 2000 mg twice a day, or viscous fiber such as Metamucil  - Attain ideal weight and regular exercise (at least 30 minutes per day of moderate exercise)  ASK ABOUT STATIN OR NON STATIN MEDICATION TO REDUCE YOUR LDL AND HEART RISK    Lowering triglycerides:  - Reduce intake of simple sugar: Desserts, candy, pastries, honey, sodas, sugared cereals, yogurt, Gatorade, sports bars, canned fruit, smoothies, fruit juice, coffee drinks  - Reduced intake of refined starches: Refined Pasta, most bread  - Reduce or abstain from alcohol  - Increase omega-3 fatty acids: [place], Trout, Mackerel, [place]ring, [place] tuna and supplements  - Attain ideal weight and regular exercise (at least 30 minutes per day of moderate exercise)  ASK ABOUT PURIFIED OMEGA 3 EPA or FISH OIL TO REDUCE YOUR TG AND HEART RISK    Elevating HDL (good) cholesterol:  - Increase physical activity  - Increase omega-3 fatty acids and supplements as listed above  - Incorporating appropriate amounts of monounsaturated fats such as nuts, olive oil, canola oil, avocados, olives  - Stop smoking  - Attain ideal weight and regular exercise (at least 30 minutes per day of moderate exercise)

## 2025-05-29 NOTE — LETTER
[facility]   [address],   [address]  [place] [address]  Phone: [phone]  Fax: [phone]              Lee Hiltonagus  1955    Encounter Date: 5/29/2025    Aman Koehler M.D.          PROGRESS NOTE:  No notes on file      EZIO FloresR.N.  [address] [address]  Via Fax: [phone]

## 2025-05-29 NOTE — PROGRESS NOTES
"[facility] Cardiology Note      Patient's PCP: IMKI Flores    CC:   Chief Complaint   Patient presents with    New Patient    Hyperlipidemia    Hypertension       HPI:  69 yo here for cardiovascular risk assement      Reited dentist    His daughter is a RN worked at [facility]   Current list of administered Medications:  Current Medications[1]    Past Medical History[2]    Past Surgical History[3]    Family History   Problem Relation Age of Onset    Glasses Mother     Glasses Father      Patient family history was personally reviewed, no pertinent family history to current presentation    Social History[4]    ALLERGIES:  Allergies[5]    Review of systems:  ROS      Physical exam:  /64 (BP Location: Left arm, Patient Position: Sitting, BP Cuff Size: Adult)   Pulse 60   Resp 16   Ht 1.854 m (6' 1\")   Wt 64 kg (141 lb)   SpO2 95%   BMI 18.60 kg/m²     Physical Exam    General: No acute distress.   EYES: no jaundice  HEENT: normal oral mucosa  Neck:  No JVD.   CVS:  [RRR. S1 + S2. No M/R/G  Resp: Normal respiratory effort, CTAB. No wheezing or crackles/rhonchi.  Abdomen: Soft, ND,  Skin: Grossly nothing acute no obvious rashes  Neurological: Alert, Moves all extremities  Extremities:   [  no edema. No cyanosis.       Data:  Laboratory studies personally reviewed by me:  Recent Results (from the past 24 weeks)   CBC WITH DIFFERENTIAL    Collection Time: 02/14/25  3:00 PM   Result Value Ref Range    WBC 9.8 4.8 - 10.8 K/uL    RBC 5.73 4.70 - 6.10 M/uL    Hemoglobin 16.4 14.0 - 18.0 g/dL    Hematocrit 48.5 42.0 - 52.0 %    MCV 84.6 81.4 - 97.8 fL    MCH 28.6 27.0 - 33.0 pg    MCHC 33.8 32.3 - 36.5 g/dL    RDW 42.1 35.9 - 50.0 fL    Platelet Count 252 164 - 446 K/uL    MPV 9.7 9.0 - 12.9 fL    Neutrophils-Polys 68.50 44.00 - 72.00 %    Lymphocytes 24.60 22.00 - 41.00 %    Monocytes 5.80 0.00 - 13.40 %    Eosinophils 0.10 0.00 - 6.90 %    Basophils 0.50 0.00 - 1.80 %    Immature Granulocytes 0.50 0.00 - " 0.90 %    Nucleated RBC 0.00 0.00 - 0.20 /100 WBC    Neutrophils (Absolute) 6.72 1.82 - 7.42 K/uL    Lymphs (Absolute) 2.41 1.00 - 4.80 K/uL    Monos (Absolute) 0.57 0.00 - 0.85 K/uL    Eos (Absolute) 0.01 0.00 - 0.51 K/uL    Baso (Absolute) 0.05 0.00 - 0.12 K/uL    Immature Granulocytes (abs) 0.05 0.00 - 0.11 K/uL    NRBC (Absolute) 0.00 K/uL   BASIC METABOLIC PANEL    Collection Time: 25  3:00 PM   Result Value Ref Range    Sodium 138 135 - 145 mmol/L    Potassium 3.8 3.6 - 5.5 mmol/L    Chloride 103 96 - 112 mmol/L    Co2 21 20 - 33 mmol/L    Glucose 112 (H) 65 - 99 mg/dL    Bun 18 8 - 22 mg/dL    Creatinine 1.33 0.50 - 1.40 mg/dL    Calcium 9.6 8.5 - 10.5 mg/dL    Anion Gap 14.0 7.0 - 16.0   APTT    Collection Time: 25  3:00 PM   Result Value Ref Range    APTT 26.6 24.7 - 36.0 sec   PROTHROMBIN TIME (INR)    Collection Time: 25  3:00 PM   Result Value Ref Range    PT 13.6 12.0 - 14.6 sec    INR 1.04 0.87 - 1.13   CRP QUANTITIVE (NON-CARDIAC)    Collection Time: 25  3:00 PM   Result Value Ref Range    Stat C-Reactive Protein <0.30 0.00 - 0.75 mg/dL   Sed Rate    Collection Time: 25  3:00 PM   Result Value Ref Range    Sed Rate Westergren 4 0 - 20 mm/hour   ESTIMATED GFR    Collection Time: 25  3:00 PM   Result Value Ref Range    GFR (CKD-EPI) 57 (A) >60 mL/min/1.73 m 2   EKG (NOW)    Collection Time: 25  8:14 PM   Result Value Ref Range    Report       [facility] Emergency Dept.    Test Date:  2025  Pt Name:    GENE PASCUCCI                Department: ER  MRN:        [id number]                      Room:       [facility]  Gender:     Male                         Technician: [id number]  :        1955                   Requested By:CANDI GARRISON  Order #:    [id number]                    Reading MD: Candi Garrison MD    Measurements  Intervals                                Axis  Rate:       69                           P:          26  NH:          169                          QRS:        -49  QRSD:       99                           T:          -58  QT:         400  QTc:        429    Interpretive Statements  Sinus rhythm at a rate of 69 no ST elevations or ST depressions or abnormal T  wave inversions or Q waves frequent PACs otherwise normal intervals normal  axis  Compared to ECG 11/07/2014 08:48:33  PAC  Electronically Signed On 02- 20:14:36 PST by Candi Rose MD               All pertinent features of laboratory and imaging reviewed including primary images where applicable      1. Mixed hyperlipidemia  CT-CARDIAC SCORING    APOLIPOPROTEIN B    CRP HIGH SENSITIVE (CARDIAC)    Lipid Profile    Lipoprotein (a)    Referral to Genetic Research Studies    US-TOTAL VASCULAR SCREENING (S/P)      2. Palpitations  EC-ECHOCARDIOGRAM COMPLETE W/O CONT          Assessment / Plan / MDM:    It was my pleasure to meet with Mr. Zazueta.    We can update cardiovascular risk assessment    Echo for rare palpitations    We will follow up with Mr. Zazueta on the results of the testing with MyChart or over the phone. We will determine further follow-up from there.    It is my pleasure to participate in the care of Mr. Zazueta.  Please do not hesitate to contact me with questions or concerns.    Aman Koehler MD PhD [facility]  Cardiologist [facility]    Please note that this dictation was created using voice recognition software. There may be errors I did not discover before finalizing the note.     5/29/2025  10:05 PM             [1] No current outpatient medications on file.  [2]   Past Medical History:  Diagnosis Date    Numbness and tingling in right hand     Pain 11-07-14    neck, right shoulder, 1/10   [3]   Past Surgical History:  Procedure Laterality Date    CERVICAL DISK AND FUSION ANTERIOR  11/21/2014    Performed by Chad Mcguire M.D. at SURGERY [facility]    CARPAL TUNNEL RELEASE  11/21/2014    Performed by Chad  NARINDER Mcguire M.D. at SURGERY [facility] ORS    INJ,EPIDURAL/LUMB/SAC SINGLE  6/6/2014    Performed by Lukas Talavera M.D. at SURGERY [facility]    OTHER  1980'S    DEVIATED SEPTUM    ROTATOR CUFF REPAIR  1970    LEFT X 2, RIGHT X 1   [4]   Social History  Tobacco Use    Smoking status: Never    Smokeless tobacco: Never   Vaping Use    Vaping status: Never Used   Substance Use Topics    Alcohol use: No    Drug use: No   [5] No Known Allergies

## 2025-05-30 NOTE — RESEARCH NOTE
Patient has been referred by Aman Koehler M.D. The initial referral follow-up message, which includes the consent form link, has been sent to the patient.    Eligible Studies: HNP

## 2025-06-02 DIAGNOSIS — Z00.6 CLINICAL TRIAL PARTICIPANT: ICD-10-CM

## 2025-06-02 NOTE — RESEARCH NOTE
Patient has signed the consent form.    The applicable research collection order(s) have been created, triggering the scheduling ticket to be sent to the patient via [employer].Follow-up message has been sent to the patient.    Patient is ready for scheduling.

## 2025-06-06 ENCOUNTER — RESULTS FOLLOW-UP (OUTPATIENT)
Dept: CARDIOLOGY | Facility: MEDICAL CENTER | Age: 70
End: 2025-06-06

## 2025-06-06 ENCOUNTER — ANCILLARY PROCEDURE (OUTPATIENT)
Facility: MEDICAL CENTER | Age: 70
End: 2025-06-06
Attending: INTERNAL MEDICINE
Payer: MEDICARE

## 2025-06-06 DIAGNOSIS — I35.1 MILD AORTIC REGURGITATION: Primary | ICD-10-CM

## 2025-06-06 DIAGNOSIS — I77.89 ASCENDING AORTA ENLARGEMENT (HCC): ICD-10-CM

## 2025-06-06 DIAGNOSIS — R00.2 PALPITATIONS: ICD-10-CM

## 2025-06-06 LAB
LV EJECT FRACT  99904: 59
LV EJECT FRACT MOD 2C 99903: 61.16
LV EJECT FRACT MOD 4C 99902: 60.94
LV EJECT FRACT MOD BP 99901: 58.63

## 2025-06-06 PROCEDURE — 93306 TTE W/DOPPLER COMPLETE: CPT

## 2025-06-06 PROCEDURE — 93306 TTE W/DOPPLER COMPLETE: CPT | Mod: 26 | Performed by: INTERNAL MEDICINE

## 2025-06-12 NOTE — RESEARCH NOTE
Patient canceled collection appt on 6/6 at [facility].    Active Referral: Yes     Sent follow-up message.Patient needs to be rescheduled.

## 2025-06-16 ENCOUNTER — HOSPITAL ENCOUNTER (OUTPATIENT)
Dept: RADIOLOGY | Facility: MEDICAL CENTER | Age: 70
End: 2025-06-16
Attending: INTERNAL MEDICINE
Payer: COMMERCIAL

## 2025-06-16 DIAGNOSIS — E78.2 MIXED HYPERLIPIDEMIA: ICD-10-CM

## 2025-06-16 PROCEDURE — 4410556 CT-CARDIAC SCORING (SELF PAY ONLY)

## 2025-06-19 ENCOUNTER — HOSPITAL ENCOUNTER (OUTPATIENT)
Dept: RADIOLOGY | Facility: MEDICAL CENTER | Age: 70
End: 2025-06-19
Attending: INTERNAL MEDICINE
Payer: COMMERCIAL

## 2025-06-19 DIAGNOSIS — E78.2 MIXED HYPERLIPIDEMIA: ICD-10-CM

## 2025-06-19 PROCEDURE — 93998 UNLISTD NONINVAS VASC DX STD: CPT

## 2025-06-19 PROCEDURE — 306723 US-TOTAL VASCULAR SCREENING (S/P)

## 2025-06-23 ENCOUNTER — RESULTS FOLLOW-UP (OUTPATIENT)
Dept: CARDIOLOGY | Facility: MEDICAL CENTER | Age: 70
End: 2025-06-23

## 2025-06-26 ENCOUNTER — HOSPITAL ENCOUNTER (OUTPATIENT)
Facility: MEDICAL CENTER | Age: 70
End: 2025-06-26
Attending: INTERNAL MEDICINE
Payer: MEDICARE

## 2025-06-26 ENCOUNTER — HOSPITAL ENCOUNTER (OUTPATIENT)
Facility: MEDICAL CENTER | Age: 70
End: 2025-06-26
Attending: FAMILY MEDICINE

## 2025-06-26 DIAGNOSIS — Z00.6 CLINICAL TRIAL PARTICIPANT: ICD-10-CM

## 2025-06-26 DIAGNOSIS — E78.2 MIXED HYPERLIPIDEMIA: ICD-10-CM

## 2025-06-26 LAB
CHOLEST SERPL-MCNC: 250 MG/DL (ref 100–199)
CRP SERPL HS-MCNC: 1.9 MG/L (ref 0–3)
FASTING STATUS PATIENT QL REPORTED: NORMAL
HDLC SERPL-MCNC: 55 MG/DL
LDLC SERPL CALC-MCNC: 169 MG/DL
TRIGL SERPL-MCNC: 130 MG/DL (ref 0–149)

## 2025-06-26 PROCEDURE — 86141 C-REACTIVE PROTEIN HS: CPT

## 2025-06-26 PROCEDURE — 36415 COLL VENOUS BLD VENIPUNCTURE: CPT

## 2025-06-26 PROCEDURE — 82172 ASSAY OF APOLIPOPROTEIN: CPT

## 2025-06-26 PROCEDURE — 80061 LIPID PANEL: CPT

## 2025-06-26 PROCEDURE — 83695 ASSAY OF LIPOPROTEIN(A): CPT

## 2025-06-28 LAB
APO B100 SERPL-MCNC: 133 MG/DL (ref 66–133)
LPA SERPL-MCNC: 7 MG/DL

## 2025-06-30 NOTE — TELEPHONE ENCOUNTER
Phone Number Called: [phone]     Call outcome: Spoke to patient's     Message: Called to inform patient of CW recommendations. Patient verbalized understanding. He would like to continue working on his diet and exercise and have lipids rechecked in 6 months to a year instead of beginning a statin. RN to inform CW. No further questions at this time.     ----- Message from Nurse Ling SARABIA R.N. sent at 6/30/2025 12:34 PM PDT -----    ----- Message -----  From: Aman Koehler M.D.  Sent: 6/28/2025  11:22 AM PDT  To: Snow Brenner R.N.    See if he wants to start atorvastatin 20 mg daily  ----- Message -----  From: Larissa, Lab  Sent: 6/26/2025  12:32 PM PDT  To: Aman Koehler M.D.

## 2025-07-06 LAB
APOB+LDLR+PCSK9 GENE MUT ANL BLD/T: NOT DETECTED
BRCA1+BRCA2 DEL+DUP + FULL MUT ANL BLD/T: NOT DETECTED
MLH1+MSH2+MSH6+PMS2 GN DEL+DUP+FUL M: NOT DETECTED

## 2025-07-07 ENCOUNTER — RESULTS FOLLOW-UP (OUTPATIENT)
Dept: MEDICAL GROUP | Facility: PHYSICIAN GROUP | Age: 70
End: 2025-07-07
Payer: MEDICARE